# Patient Record
Sex: MALE | Race: OTHER
[De-identification: names, ages, dates, MRNs, and addresses within clinical notes are randomized per-mention and may not be internally consistent; named-entity substitution may affect disease eponyms.]

---

## 2020-04-25 ENCOUNTER — HOSPITAL ENCOUNTER (EMERGENCY)
Dept: HOSPITAL 72 - EMR | Age: 40
Discharge: HOME | End: 2020-04-25
Payer: MEDICARE

## 2020-04-25 VITALS — SYSTOLIC BLOOD PRESSURE: 148 MMHG | DIASTOLIC BLOOD PRESSURE: 98 MMHG

## 2020-04-25 VITALS — DIASTOLIC BLOOD PRESSURE: 90 MMHG | SYSTOLIC BLOOD PRESSURE: 140 MMHG

## 2020-04-25 VITALS — HEIGHT: 73 IN | WEIGHT: 262 LBS | BODY MASS INDEX: 34.72 KG/M2

## 2020-04-25 DIAGNOSIS — S00.83XA: ICD-10-CM
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
## 2020-04-25 DIAGNOSIS — T50.905A: Primary | ICD-10-CM

## 2020-04-25 LAB
ADD MANUAL DIFF: NO
ALBUMIN SERPL-MCNC: 4 G/DL (ref 3.4–5)
ALBUMIN/GLOB SERPL: 1.1 {RATIO} (ref 1–2.7)
ALP SERPL-CCNC: 48 U/L (ref 46–116)
ALT SERPL-CCNC: 28 U/L (ref 12–78)
ANION GAP SERPL CALC-SCNC: 14 MMOL/L (ref 5–15)
APPEARANCE UR: CLEAR
APTT PPP: YELLOW S
AST SERPL-CCNC: 22 U/L (ref 15–37)
BASOPHILS NFR BLD AUTO: 1 % (ref 0–2)
BILIRUB SERPL-MCNC: 0.3 MG/DL (ref 0.2–1)
BUN SERPL-MCNC: 15 MG/DL (ref 7–18)
CALCIUM SERPL-MCNC: 9.2 MG/DL (ref 8.5–10.1)
CHLORIDE SERPL-SCNC: 108 MMOL/L (ref 98–107)
CO2 SERPL-SCNC: 20 MMOL/L (ref 21–32)
CREAT SERPL-MCNC: 1.1 MG/DL (ref 0.55–1.3)
EOSINOPHIL NFR BLD AUTO: 1.2 % (ref 0–3)
ERYTHROCYTE [DISTWIDTH] IN BLOOD BY AUTOMATED COUNT: 12.8 % (ref 11.6–14.8)
GLOBULIN SER-MCNC: 3.6 G/DL
GLUCOSE UR STRIP-MCNC: NEGATIVE MG/DL
HCT VFR BLD CALC: 46.2 % (ref 42–52)
HGB BLD-MCNC: 14.9 G/DL (ref 14.2–18)
KETONES UR QL STRIP: (no result)
LEUKOCYTE ESTERASE UR QL STRIP: (no result)
LYMPHOCYTES NFR BLD AUTO: 31.3 % (ref 20–45)
MCV RBC AUTO: 89 FL (ref 80–99)
MONOCYTES NFR BLD AUTO: 9 % (ref 1–10)
NEUTROPHILS NFR BLD AUTO: 57.5 % (ref 45–75)
NITRITE UR QL STRIP: NEGATIVE
PH UR STRIP: 6 [PH] (ref 4.5–8)
PLATELET # BLD: 230 K/UL (ref 150–450)
POTASSIUM SERPL-SCNC: 3.7 MMOL/L (ref 3.5–5.1)
PROT UR QL STRIP: (no result)
RBC # BLD AUTO: 5.17 M/UL (ref 4.7–6.1)
SODIUM SERPL-SCNC: 142 MMOL/L (ref 136–145)
SP GR UR STRIP: 1.02 (ref 1–1.03)
UROBILINOGEN UR-MCNC: 1 MG/DL (ref 0–1)
WBC # BLD AUTO: 10 K/UL (ref 4.8–10.8)

## 2020-04-25 PROCEDURE — 81003 URINALYSIS AUTO W/O SCOPE: CPT

## 2020-04-25 PROCEDURE — 70450 CT HEAD/BRAIN W/O DYE: CPT

## 2020-04-25 PROCEDURE — 99284 EMERGENCY DEPT VISIT MOD MDM: CPT

## 2020-04-25 PROCEDURE — 80307 DRUG TEST PRSMV CHEM ANLYZR: CPT

## 2020-04-25 PROCEDURE — 83735 ASSAY OF MAGNESIUM: CPT

## 2020-04-25 PROCEDURE — 36415 COLL VENOUS BLD VENIPUNCTURE: CPT

## 2020-04-25 PROCEDURE — 80053 COMPREHEN METABOLIC PANEL: CPT

## 2020-04-25 PROCEDURE — 85025 COMPLETE CBC W/AUTO DIFF WBC: CPT

## 2020-04-25 NOTE — EMERGENCY ROOM REPORT
History of Present Illness


General


Chief Complaint:  Behavioral Complaint


Source:  EMS





Present Illness


HPI


39-year-old male with history of bipolar disorder brought in by paramedics from 

a behavioral center of unknown origin due to behavioral changes.  According to 

paramedics they were told that the patient has been acting different since the 

dosing of his medication saphris was changed recently.  Patient also had fallen 

on face without any loss of consciousness however there is no reports or 

facesheet to be presented but with the patient.  Denies any chest pain 

shortness of breath.  Denies headache and dizziness at this time.  Denies any 

SI and HI.  Denies any drug use, alcohol intake.


Allergies:  


Coded Allergies:  


     No Known Allergies (Unverified , 4/25/20)





COVID-19 Screening


Contact w/high risk pt:  No


Recent Travel to affected area:  No


Experienced COVID-19 symptoms?:  No





Patient History


Past Medical History:  see triage record


Past Surgical History:  none


Pertinent Family History:  none


Immunizations:  UTD


Reviewed Nursing Documentation:  PMH: Agreed; PSxH: Agreed





Nursing Documentation-PMH


Past Medical History:  No History, Except For





Review of Systems


All Other Systems:  negative except mentioned in HPI





Physical Exam





Vital Signs








  Date Time  Temp Pulse Resp B/P (MAP) Pulse Ox O2 Delivery O2 Flow Rate FiO2


 


4/25/20 15:25 98.2 100 18 148/98 (115) 98 Room Air  








Sp02 EP Interpretation:  reviewed, normal


General Appearance:  no apparent distress, alert, GCS 15, non-toxic


Head:  normocephalic, atraumatic


Eyes:  bilateral eye normal inspection, bilateral eye PERRL


ENT:  hearing grossly normal, normal pharynx, no angioedema, normal voice


Neck:  full range of motion, supple/symm/no masses


Respiratory:  chest non-tender, lungs clear, normal breath sounds, speaking 

full sentences


Cardiovascular #1:  regular rate, rhythm, no edema, no murmur


Gastrointestinal:  normal bowel sounds, non tender, soft, non-distended, no 

guarding, no rebound


Rectal:  deferred


Genitourinary:  no CVA tenderness


Musculoskeletal:  back normal


Neurologic:  alert, motor strength/tone normal, oriented x3, sensory intact, 

responsive, speech normal


Psychiatric:  no suicidal/homicidal ideation


Skin:  abrasion - Healing abrasions on the face


Lymphatic:  no adenopathy





Medical Decision Making


PA Attestation


All my diagnosis and treatment plans were reviewed ad discussed with my 

supervising physician Dr. Herrera


Diagnostic Impression:  


 Primary Impression:  


 Medication side effect


 Additional Impression:  


 Facial contusion


ER Course


39-year-old male with history of bipolar disorder brought in by paramedics from 

a behavioral center of unknown origin due to behavioral changes.  According to 

paramedics they were told that the patient has been acting different since the 

dosing of his medication saphris was changed recently.  Patient also had fallen 

on face without any loss of consciousness however there is no reports or 

facesheet to be presented but with the patient.  Denies any chest pain 

shortness of breath.  Denies headache and dizziness at this time.  Denies any 

SI and HI.  Denies any drug use, alcohol intake.





Ddx considered but are not limited to: generalized anxiety disorder, panic 

attack, depression with psychotic features, bipolar disorder, drug overdose 

facial contusion, cerebral hematoma














Vital signs: are WNL, pt. is afebrile








 H&PE are most consistent with: Medication side effect, facial contusion














ORDERS: CBC, CMP, UA, tox screen, magnesium, EtOH level, head CT no contrast








ED INTERVENTIONS: None required at this time.























DISCHARGE: At this time pt. is stable for d/c to home. Will provide printed 

patient care instructions, and any necessary prescriptions. Care plan and 

follow up instructions have been discussed with the patient prior to discharge.

  Patient was sent back to his facility, transport was called in and given a 

ETA however patient family member came and picked him up.  Advised patient 

return to emergency room worsening symptoms.  Patient was put in isolation due 

to possible COVID due to coming from the facility.


CT/MRI/US Diagnostic Results


CT/MRI/US Diagnostic Results :  


   Imaging Test Ordered:  Head CT no contrast


   Impression


IMPRESSION:


1. No acute intracranial abnormality.


2. Right sphenoid sinus mild air-fluid layer could represent sinusitis.


3. Unremarkable study.





Last Vital Signs








  Date Time  Temp Pulse Resp B/P (MAP) Pulse Ox O2 Delivery O2 Flow Rate FiO2


 


4/25/20 15:53 98.2  18 148/98 98 Room Air  


 


4/25/20 15:53  100      








Disposition:  HOME, SELF-CARE


Condition:  Stable


Referrals:  


NON PHYSICIAN (PCP)


Patient Instructions:  Facial or Scalp Contusion, Easy-to-Read, Self-

Destructive Behavior





Additional Instructions:  


Have psychiatrist readjust the dosing of the medication, at this time patient 

is in no distress, medically cleared to return to the psychiatric facility.  

Follow-up with primary doctor.  If worsening symptom return to the emergency 

room











Gauri Palacios Apr 25, 2020 16:56

## 2020-04-25 NOTE — DIAGNOSTIC IMAGING REPORT
EXAM:

  CT Head Without Intravenous Contrast

 

CLINICAL HISTORY:

  AMS

 

TECHNIQUE:

  Axial computed tomography images of the head/brain without intravenous 

contrast.  CTDI is 53 mGy and DLP is 1045 mGy-cm.  One or more of the 

following dose reduction techniques were used: automated exposure control,

 adjustment of the mA and/or kV according to patient size, use of 

iterative reconstruction technique.

 

COMPARISON:

  No relevant prior studies available.

 

FINDINGS:

  Brain:  Unremarkable.  No hemorrhage.  No significant white matter 

disease.  No edema.

  Ventricles:  Unremarkable.  No ventriculomegaly.

  Bones/joints:  Unremarkable.  No acute fracture.

  Soft tissues:  Unremarkable.

  Sinuses:  Right sphenoid sinus mild air-fluid layer could represent 

sinusitis.  Otherwise unremarkable paranasal sinuses.

  Mastoid air cells:  Unremarkable as visualized.  No mastoid effusion.

  Other findings:  Unremarkable study.

 

IMPRESSION:     

1.  No acute intracranial abnormality.

2.  Right sphenoid sinus mild air-fluid layer could represent sinusitis.

3.  Unremarkable study.

## 2020-04-25 NOTE — NUR
ED Nurse Note:



Called Gena, owner of [facility] facility that pt is staying at, informed that pt's 
ready for discharge. Charge nurse aware.

## 2020-04-25 NOTE — NUR
ER DISCHARGE NOTE:



Patient is cleared to be discharged per ERPA, pt is aox2, on room air, with 
stable vital signs. pt's primary care giver was given dc and prescription 
instructions, primary care giver was able to verbalize understanding, pt id 
band and iv site removed without complications. pt is able to ambulate with 
steady gait. pt took all belongings.